# Patient Record
Sex: MALE | Race: WHITE | NOT HISPANIC OR LATINO | URBAN - METROPOLITAN AREA
[De-identification: names, ages, dates, MRNs, and addresses within clinical notes are randomized per-mention and may not be internally consistent; named-entity substitution may affect disease eponyms.]

---

## 2022-01-28 ENCOUNTER — WEB ENCOUNTER (OUTPATIENT)
Dept: URBAN - METROPOLITAN AREA CLINIC 70 | Facility: CLINIC | Age: 27
End: 2022-01-28

## 2022-01-28 ENCOUNTER — OFFICE VISIT (OUTPATIENT)
Dept: URBAN - METROPOLITAN AREA CLINIC 70 | Facility: CLINIC | Age: 27
End: 2022-01-28
Payer: COMMERCIAL

## 2022-01-28 DIAGNOSIS — R11.0 NAUSEA: ICD-10-CM

## 2022-01-28 DIAGNOSIS — R19.7 DIARRHEA, UNSPECIFIED TYPE: ICD-10-CM

## 2022-01-28 PROCEDURE — 99203 OFFICE O/P NEW LOW 30 MIN: CPT | Performed by: REGISTERED NURSE

## 2022-01-28 RX ORDER — BUTALBITAL, ACETAMINOPHEN, AND CAFFEINE 50; 325; 40 MG/1; MG/1; MG/1
TABLET, COATED ORAL
Qty: 60 | Status: ACTIVE | COMMUNITY

## 2022-01-28 NOTE — HPI-TODAY'S VISIT:
Pt c/o diarrhea. Diarrhea has been present for 3 months. They are having 3-5 BMs/day. Stool is loose. Associated symptoms include nausea. No workup has been done. No new medications, antibiotic use or foreign travel.

## 2022-03-10 ENCOUNTER — DASHBOARD ENCOUNTERS (OUTPATIENT)
Age: 27
End: 2022-03-10

## 2022-03-11 ENCOUNTER — OFFICE VISIT (OUTPATIENT)
Dept: URBAN - METROPOLITAN AREA CLINIC 70 | Facility: CLINIC | Age: 27
End: 2022-03-11

## 2022-03-11 RX ORDER — BUTALBITAL, ACETAMINOPHEN, AND CAFFEINE 50; 325; 40 MG/1; MG/1; MG/1
TABLET, COATED ORAL
Qty: 60 | COMMUNITY

## 2022-03-11 NOTE — HPI-OTHER HISTORIES
Note from OV 1/28/22: Pt c/o diarrhea. Diarrhea has been present for 3 months. They are having 3-5 BMs/day. Stool is loose. Associated symptoms include nausea. No workup has been done. No new medications, antibiotic use or foreign travel. ----------------------------------